# Patient Record
Sex: FEMALE | Race: WHITE | NOT HISPANIC OR LATINO | URBAN - NONMETROPOLITAN AREA
[De-identification: names, ages, dates, MRNs, and addresses within clinical notes are randomized per-mention and may not be internally consistent; named-entity substitution may affect disease eponyms.]

---

## 2023-04-18 DIAGNOSIS — E03.9 HYPOTHYROIDISM (ACQUIRED): Primary | ICD-10-CM

## 2023-04-18 RX ORDER — LEVOTHYROXINE SODIUM 112 UG/1
1 TABLET ORAL DAILY
COMMUNITY
Start: 2023-03-14 | End: 2023-04-18 | Stop reason: SDUPTHER

## 2023-04-19 RX ORDER — LEVOTHYROXINE SODIUM 112 UG/1
112 TABLET ORAL DAILY
Qty: 30 TABLET | Refills: 0 | Status: SHIPPED | OUTPATIENT
Start: 2023-04-19 | End: 2023-05-15

## 2023-04-26 ENCOUNTER — OFFICE VISIT (OUTPATIENT)
Dept: PRIMARY CARE | Facility: CLINIC | Age: 53
End: 2023-04-26
Payer: COMMERCIAL

## 2023-04-26 VITALS
HEART RATE: 100 BPM | HEIGHT: 67 IN | OXYGEN SATURATION: 99 % | SYSTOLIC BLOOD PRESSURE: 132 MMHG | WEIGHT: 160 LBS | BODY MASS INDEX: 25.11 KG/M2 | DIASTOLIC BLOOD PRESSURE: 80 MMHG

## 2023-04-26 DIAGNOSIS — M75.81 ROTATOR CUFF TENDINITIS, RIGHT: ICD-10-CM

## 2023-04-26 DIAGNOSIS — E78.1 HYPERTRIGLYCERIDEMIA: ICD-10-CM

## 2023-04-26 DIAGNOSIS — Z12.31 SCREENING MAMMOGRAM, ENCOUNTER FOR: ICD-10-CM

## 2023-04-26 DIAGNOSIS — Z12.11 ENCOUNTER FOR SCREENING COLONOSCOPY: ICD-10-CM

## 2023-04-26 DIAGNOSIS — M75.51 BURSITIS OF RIGHT SHOULDER: ICD-10-CM

## 2023-04-26 DIAGNOSIS — E55.9 VITAMIN D DEFICIENCY: ICD-10-CM

## 2023-04-26 DIAGNOSIS — G25.0 BENIGN ESSENTIAL TREMOR: ICD-10-CM

## 2023-04-26 DIAGNOSIS — R73.01 IMPAIRED FASTING GLUCOSE: ICD-10-CM

## 2023-04-26 DIAGNOSIS — E03.9 ACQUIRED HYPOTHYROIDISM: Primary | ICD-10-CM

## 2023-04-26 PROBLEM — R53.83 MALAISE AND FATIGUE: Status: RESOLVED | Noted: 2023-04-26 | Resolved: 2023-04-26

## 2023-04-26 PROBLEM — R07.89 OTHER CHEST PAIN: Status: RESOLVED | Noted: 2023-04-26 | Resolved: 2023-04-26

## 2023-04-26 PROBLEM — M54.12 CERVICAL RADICULOPATHY: Status: ACTIVE | Noted: 2023-04-26

## 2023-04-26 PROBLEM — R03.0 ELEVATED BLOOD PRESSURE READING: Status: ACTIVE | Noted: 2023-04-26

## 2023-04-26 PROBLEM — M54.42 ACUTE LEFT-SIDED LOW BACK PAIN WITH LEFT-SIDED SCIATICA: Status: RESOLVED | Noted: 2023-04-26 | Resolved: 2023-04-26

## 2023-04-26 PROBLEM — L29.9 ITCHING OF EAR: Status: RESOLVED | Noted: 2023-04-26 | Resolved: 2023-04-26

## 2023-04-26 PROBLEM — S05.8X9A SUPERFICIAL INJURY OF CORNEA: Status: RESOLVED | Noted: 2023-04-26 | Resolved: 2023-04-26

## 2023-04-26 PROBLEM — R25.1 TREMOR: Status: ACTIVE | Noted: 2023-04-26

## 2023-04-26 PROBLEM — R53.81 MALAISE AND FATIGUE: Status: RESOLVED | Noted: 2023-04-26 | Resolved: 2023-04-26

## 2023-04-26 PROBLEM — J30.9 ALLERGIC RHINITIS: Status: ACTIVE | Noted: 2023-04-26

## 2023-04-26 LAB
CHOLESTEROL (MG/DL) IN SER/PLAS: 230 MG/DL (ref 0–199)
CHOLESTEROL IN HDL (MG/DL) IN SER/PLAS: 43.6 MG/DL
CHOLESTEROL/HDL RATIO: 5.3
GLUCOSE (MG/DL) IN SER/PLAS: 102 MG/DL (ref 74–99)
LDL: 158 MG/DL (ref 0–99)
THYROTROPIN (MIU/L) IN SER/PLAS BY DETECTION LIMIT <= 0.05 MIU/L: 1.18 MIU/L (ref 0.44–3.98)
TRIGLYCERIDE (MG/DL) IN SER/PLAS: 144 MG/DL (ref 0–149)
VLDL: 29 MG/DL (ref 0–40)

## 2023-04-26 PROCEDURE — 84443 ASSAY THYROID STIM HORMONE: CPT

## 2023-04-26 PROCEDURE — 80061 LIPID PANEL: CPT

## 2023-04-26 PROCEDURE — 1036F TOBACCO NON-USER: CPT | Performed by: FAMILY MEDICINE

## 2023-04-26 PROCEDURE — 82947 ASSAY GLUCOSE BLOOD QUANT: CPT

## 2023-04-26 PROCEDURE — 36415 COLL VENOUS BLD VENIPUNCTURE: CPT | Performed by: FAMILY MEDICINE

## 2023-04-26 PROCEDURE — 99214 OFFICE O/P EST MOD 30 MIN: CPT | Performed by: FAMILY MEDICINE

## 2023-04-26 PROCEDURE — 82306 VITAMIN D 25 HYDROXY: CPT

## 2023-04-26 RX ORDER — METHYLPREDNISOLONE 4 MG/1
TABLET ORAL
Qty: 21 TABLET | Refills: 0 | Status: SHIPPED | OUTPATIENT
Start: 2023-04-26 | End: 2023-05-03

## 2023-04-26 RX ORDER — CHOLECALCIFEROL (VITAMIN D3) 25 MCG
25 TABLET ORAL DAILY
COMMUNITY

## 2023-04-26 RX ORDER — CALCIUM CARBONATE 300MG(750)
400 TABLET,CHEWABLE ORAL DAILY
COMMUNITY

## 2023-04-26 NOTE — PROGRESS NOTES
Subjective   Patient ID: Arlyn Ervin is a 53 y.o. female who presents for Hypothyroidism (Thyroid check up ).  HPI  No SE meds  No CP, SOB, palpitations, numbness, weakness, dizziness, HA, vision changes    Saw ENT for irritation in ear- given drop that helped some   Has patches on knees- do not bleed when rubs off the scale    Feels like muscles tight in trap area on the right that will go down into arm (mostly only upper arm but will go to forearm)- pain on top of shoulder- been going on for years  Used heat on it    Energy level is okay  No constipation     Ophtho- 8/22  Dentist- 4-5 years  Colonoscopy- refused  DANIELLE- refused  Cologuard- refused  FOBT- refused  Mammo- ordered  DEXA-  PAP- 2019  Lung CT-  AAA-  EKG-  Pneumovax-  Prevnar-  Flu- refused  Zostavax-  Td-  Hep C- 1/20  DPOA-HC-  DNR-  Living Will     Current Outpatient Medications:     cholecalciferol (Vitamin D-3) 25 MCG (1000 UT) tablet, Take 1 tablet (25 mcg) by mouth once daily., Disp: , Rfl:     magnesium oxide (Mag-Ox) 400 mg tablet, Take 1 tablet (400 mg) by mouth once daily., Disp: , Rfl:     methylPREDNISolone (Medrol Dospak) 4 mg tablets, Take as directed on package., Disp: 21 tablet, Rfl: 0    Synthroid 112 mcg tablet, Take 1 tablet (112 mcg) by mouth once daily., Disp: 30 tablet, Rfl: 0    vitamin B complex (B-COMPLEX ORAL), Take by mouth., Disp: , Rfl:     ZINC ACETATE ORAL, Take 20 mg by mouth., Disp: , Rfl:    Past Surgical History:   Procedure Laterality Date    LUMBAR DISCECTOMY  03/07/2022    OTHER SURGICAL HISTORY  01/16/2019    Lumbar discectomy      Past Medical History:   Diagnosis Date    Acute gastritis without bleeding 01/16/2019    Acute gastritis without hemorrhage    Acute left-sided low back pain with left-sided sciatica 04/26/2023    Acute upper respiratory infection, unspecified 03/28/2016    Viral upper respiratory illness    Encounter for other preprocedural examination 06/24/2014    Pre-operative general physical  "examination    Encounter for screening for diabetes mellitus 04/26/2018    Diabetes mellitus screening    Encounter for screening for lipoid disorders 04/26/2018    Encounter for screening for lipoid disorders    Essential (primary) hypertension 01/22/2016    Benign essential hypertension    Other intervertebral disc displacement, lumbar region 08/29/2014    Lumbar herniated disc    Personal history of other diseases of the nervous system and sense organs 02/24/2018    History of conjunctivitis    Sprain of jaw, unspecified side, initial encounter 04/15/2015    TMJ (sprain of temporomandibular joint)     Social History     Tobacco Use    Smoking status: Never    Smokeless tobacco: Never   Substance Use Topics    Alcohol use: Not Currently    Drug use: Never      No family history on file.   Review of Systems    Objective   /80   Pulse 100   Ht 1.702 m (5' 7\")   Wt 72.6 kg (160 lb)   SpO2 99%   BMI 25.06 kg/m²    Physical Exam  Vitals and nursing note reviewed.   Constitutional:       General: She is not in acute distress.     Appearance: Normal appearance.   HENT:      Head: Normocephalic and atraumatic.      Right Ear: Tympanic membrane, ear canal and external ear normal.      Left Ear: Tympanic membrane, ear canal and external ear normal.      Nose: Nose normal.      Mouth/Throat:      Mouth: Mucous membranes are moist.      Pharynx: Oropharynx is clear.   Eyes:      Extraocular Movements: Extraocular movements intact.      Pupils: Pupils are equal, round, and reactive to light.   Cardiovascular:      Rate and Rhythm: Normal rate and regular rhythm.      Pulses: Normal pulses.      Heart sounds: Normal heart sounds. No murmur heard.  Pulmonary:      Effort: Pulmonary effort is normal.      Breath sounds: Normal breath sounds.   Abdominal:      General: Abdomen is flat. Bowel sounds are normal.      Palpations: Abdomen is soft. There is no mass.   Musculoskeletal:      Cervical back: Normal range of " motion and neck supple.      Comments: TTP in right trap area and anterior shoulder  + impingement in right shoulder   Lymphadenopathy:      Cervical: No cervical adenopathy.   Skin:     Capillary Refill: Capillary refill takes less than 2 seconds.      Comments: Dry patches on b/l knees   Neurological:      General: No focal deficit present.      Mental Status: She is alert and oriented to person, place, and time.      Cranial Nerves: No cranial nerve deficit.      Motor: No weakness.      Deep Tendon Reflexes: Reflexes normal.      Comments: Slight head tremor   Psychiatric:         Mood and Affect: Mood normal.         Behavior: Behavior normal.         Assessment/Plan   Problem List Items Addressed This Visit       Hypertriglyceridemia    Relevant Orders    Lipid Panel (Completed)    Hypothyroidism - Primary    Relevant Orders    TSH with reflex to Free T4 if abnormal (Completed)    Impaired fasting glucose    Relevant Orders    Glucose (Completed)    Benign essential tremor    Vitamin D deficiency    Relevant Orders    Vitamin D, Total    Rotator cuff tendinitis, right    Relevant Medications    methylPREDNISolone (Medrol Dospak) 4 mg tablets    Other Relevant Orders    Referral to Physical Therapy    Bursitis of right shoulder    Relevant Medications    methylPREDNISolone (Medrol Dospak) 4 mg tablets    Other Relevant Orders    Referral to Physical Therapy     Other Visit Diagnoses       Encounter for screening colonoscopy        Relevant Orders    Colonoscopy    Screening mammogram, encounter for        Relevant Orders    BI mammo bilateral screening tomosynthesis        HTN- DASH diet, controlled without meds    Bursitis/rotator cuff tendinitis- heat, medrol, PT    Hyperlipidemia/IFG- CV exercise/limit fatty foods    Tremor- refused meds at this time, limit caffeine    Hypothyroidism- check TSH, synthroid    Patient understands and agrees with treatment plan    Luis A Howard, DO

## 2023-04-27 LAB — CALCIDIOL (25 OH VITAMIN D3) (NG/ML) IN SER/PLAS: 24 NG/ML

## 2023-04-27 NOTE — RESULT ENCOUNTER NOTE
Cholesterol levels slightly elevated- limit fatty foods and increase CV exercise. Blood sugar slightly elevated for fasting- limit sugary foods. Thyroid function is good. Vitamin D level is slightly low- double vitamin D supplement

## 2023-04-28 ENCOUNTER — TELEPHONE (OUTPATIENT)
Dept: PRIMARY CARE | Facility: CLINIC | Age: 53
End: 2023-04-28
Payer: COMMERCIAL

## 2023-05-15 DIAGNOSIS — E03.9 HYPOTHYROIDISM (ACQUIRED): ICD-10-CM

## 2023-05-15 RX ORDER — LEVOTHYROXINE SODIUM 112 UG/1
112 TABLET ORAL DAILY
Qty: 90 TABLET | Refills: 1 | Status: SHIPPED | OUTPATIENT
Start: 2023-05-15 | End: 2023-06-09 | Stop reason: SDUPTHER

## 2023-05-21 NOTE — TELEPHONE ENCOUNTER
Late entry -   Pt paged 8:27 pm     Took a dose of steroids she was given  -facial flushing     Discussed this common side effect -   And its benign -     Discussed signs and sx that are of concern to stop med if occur -   Such as SOB or hives

## 2023-06-09 DIAGNOSIS — E03.9 HYPOTHYROIDISM (ACQUIRED): ICD-10-CM

## 2023-06-09 RX ORDER — LEVOTHYROXINE SODIUM 112 UG/1
112 TABLET ORAL DAILY
Qty: 90 TABLET | Refills: 1 | Status: SHIPPED | OUTPATIENT
Start: 2023-06-09 | End: 2023-11-13

## 2023-11-12 DIAGNOSIS — E03.9 HYPOTHYROIDISM (ACQUIRED): ICD-10-CM

## 2023-11-13 RX ORDER — LEVOTHYROXINE SODIUM 112 UG/1
112 TABLET ORAL DAILY
Qty: 90 TABLET | Refills: 1 | Status: SHIPPED | OUTPATIENT
Start: 2023-11-13 | End: 2024-04-05

## 2024-03-12 ENCOUNTER — OFFICE VISIT (OUTPATIENT)
Dept: PRIMARY CARE | Facility: CLINIC | Age: 54
End: 2024-03-12
Payer: COMMERCIAL

## 2024-03-12 VITALS
OXYGEN SATURATION: 97 % | SYSTOLIC BLOOD PRESSURE: 126 MMHG | HEART RATE: 82 BPM | DIASTOLIC BLOOD PRESSURE: 78 MMHG | WEIGHT: 163 LBS | HEIGHT: 67 IN | BODY MASS INDEX: 25.58 KG/M2

## 2024-03-12 DIAGNOSIS — J01.80 ACUTE NON-RECURRENT SINUSITIS OF OTHER SINUS: Primary | ICD-10-CM

## 2024-03-12 PROCEDURE — 1036F TOBACCO NON-USER: CPT | Performed by: FAMILY MEDICINE

## 2024-03-12 PROCEDURE — 99213 OFFICE O/P EST LOW 20 MIN: CPT | Performed by: FAMILY MEDICINE

## 2024-03-12 RX ORDER — CLOBETASOL PROPIONATE 0.5 MG/G
CREAM TOPICAL 2 TIMES DAILY
COMMUNITY
Start: 2023-12-02

## 2024-03-12 RX ORDER — AMOXICILLIN 875 MG/1
875 TABLET, FILM COATED ORAL 2 TIMES DAILY
Qty: 20 TABLET | Refills: 0 | Status: SHIPPED | OUTPATIENT
Start: 2024-03-12 | End: 2024-03-22

## 2024-03-12 NOTE — PROGRESS NOTES
Subjective   Patient ID: Arlyn Ervin is a 53 y.o. female who presents for URI (HEAD CONGESTION CHEST CONGESTION PRODUCTIVE COUGH, NO FEVER NO CHILLS, FATIGUE ).  HPI  Has been sick for 2-3 weeks  + sinus drainage  + PND, runny nose  + NP and Productive cough   No fever, chills  HA off and on  No ear pain, ST  Chest feels sore  No SOB, n/v, diarrhea, abdominal pain   Slight fatigue  Body aches off and on  Taking Vitamin C, vaporizer    Current Outpatient Medications:     clobetasol (Temovate) 0.05 % cream, Apply topically 2 times a day., Disp: , Rfl:     amoxicillin (Amoxil) 875 mg tablet, Take 1 tablet (875 mg) by mouth 2 times a day for 10 days., Disp: 20 tablet, Rfl: 0    cholecalciferol (Vitamin D-3) 25 MCG (1000 UT) tablet, Take 1 tablet (25 mcg) by mouth once daily., Disp: , Rfl:     magnesium oxide (Mag-Ox) 400 mg tablet, Take 1 tablet (400 mg) by mouth once daily., Disp: , Rfl:     Synthroid 112 mcg tablet, take 1 tablet by mouth once daily, Disp: 90 tablet, Rfl: 1    vitamin B complex (B-COMPLEX ORAL), Take by mouth., Disp: , Rfl:     ZINC ACETATE ORAL, Take 20 mg by mouth., Disp: , Rfl:    Past Surgical History:   Procedure Laterality Date    LUMBAR DISCECTOMY  03/07/2022    OTHER SURGICAL HISTORY  01/16/2019    Lumbar discectomy      Past Medical History:   Diagnosis Date    Acute gastritis without bleeding 01/16/2019    Acute gastritis without hemorrhage    Acute left-sided low back pain with left-sided sciatica 04/26/2023    Acute upper respiratory infection, unspecified 03/28/2016    Viral upper respiratory illness    Encounter for other preprocedural examination 06/24/2014    Pre-operative general physical examination    Encounter for screening for diabetes mellitus 04/26/2018    Diabetes mellitus screening    Encounter for screening for lipoid disorders 04/26/2018    Encounter for screening for lipoid disorders    Essential (primary) hypertension 01/22/2016    Benign essential hypertension    Other  "intervertebral disc displacement, lumbar region 08/29/2014    Lumbar herniated disc    Personal history of other diseases of the nervous system and sense organs 02/24/2018    History of conjunctivitis    Sprain of jaw, unspecified side, initial encounter 04/15/2015    TMJ (sprain of temporomandibular joint)     Social History     Tobacco Use    Smoking status: Never    Smokeless tobacco: Never   Substance Use Topics    Alcohol use: Not Currently    Drug use: Never      No family history on file.   Review of Systems    Objective   /78   Pulse 82   Ht 1.702 m (5' 7\")   Wt 73.9 kg (163 lb)   SpO2 97%   BMI 25.53 kg/m²    Physical Exam  Vitals and nursing note reviewed.   Constitutional:       General: She is not in acute distress.     Appearance: Normal appearance. She is not ill-appearing.   HENT:      Head: Normocephalic and atraumatic.      Right Ear: Tympanic membrane, ear canal and external ear normal.      Left Ear: Tympanic membrane, ear canal and external ear normal.      Nose: Congestion present.      Mouth/Throat:      Mouth: Mucous membranes are moist.      Pharynx: Posterior oropharyngeal erythema present. No oropharyngeal exudate.   Eyes:      General:         Right eye: No discharge.         Left eye: No discharge.      Extraocular Movements: Extraocular movements intact.      Conjunctiva/sclera: Conjunctivae normal.      Pupils: Pupils are equal, round, and reactive to light.   Cardiovascular:      Rate and Rhythm: Normal rate and regular rhythm.      Pulses: Normal pulses.      Heart sounds: Normal heart sounds.   Pulmonary:      Effort: Pulmonary effort is normal.      Breath sounds: Normal breath sounds. No wheezing, rhonchi or rales.   Musculoskeletal:      Cervical back: Normal range of motion and neck supple.   Lymphadenopathy:      Cervical: Cervical adenopathy present.      Right cervical: Superficial cervical adenopathy present. No deep or posterior cervical adenopathy.     Left " cervical: Superficial cervical adenopathy present. No deep or posterior cervical adenopathy.   Skin:     Capillary Refill: Capillary refill takes less than 2 seconds.   Neurological:      General: No focal deficit present.      Mental Status: She is alert and oriented to person, place, and time.   Psychiatric:         Mood and Affect: Mood normal.         Behavior: Behavior normal.         Assessment/Plan   Problem List Items Addressed This Visit    None  Visit Diagnoses       Acute non-recurrent sinusitis of other sinus    -  Primary    Relevant Medications    amoxicillin (Amoxil) 875 mg tablet        Fluids, OTC meds    Told parent/patient that if no improvement in 2-3 days then please call. If worsens or new symptoms occur then please call when this occurs. If worsening then go to ER immediately      Patient understands and agrees with treatment plan    Luis A Howard, DO

## 2024-04-05 DIAGNOSIS — E03.9 HYPOTHYROIDISM (ACQUIRED): ICD-10-CM

## 2024-04-05 RX ORDER — LEVOTHYROXINE SODIUM 112 UG/1
112 TABLET ORAL DAILY
Qty: 90 TABLET | Refills: 1 | Status: SHIPPED | OUTPATIENT
Start: 2024-04-05

## 2024-06-26 DIAGNOSIS — E03.9 HYPOTHYROIDISM (ACQUIRED): ICD-10-CM

## 2024-06-26 RX ORDER — LEVOTHYROXINE SODIUM 112 UG/1
112 TABLET ORAL DAILY
Qty: 30 TABLET | Refills: 0 | Status: SHIPPED | OUTPATIENT
Start: 2024-06-26

## 2024-07-19 ENCOUNTER — APPOINTMENT (OUTPATIENT)
Dept: PRIMARY CARE | Facility: CLINIC | Age: 54
End: 2024-07-19
Payer: COMMERCIAL

## 2024-07-19 VITALS
BODY MASS INDEX: 26.53 KG/M2 | HEIGHT: 67 IN | HEART RATE: 88 BPM | DIASTOLIC BLOOD PRESSURE: 80 MMHG | WEIGHT: 169 LBS | OXYGEN SATURATION: 98 % | SYSTOLIC BLOOD PRESSURE: 140 MMHG

## 2024-07-19 DIAGNOSIS — Z12.31 VISIT FOR SCREENING MAMMOGRAM: ICD-10-CM

## 2024-07-19 DIAGNOSIS — E78.1 HYPERTRIGLYCERIDEMIA: ICD-10-CM

## 2024-07-19 DIAGNOSIS — Z12.11 SCREENING FOR COLORECTAL CANCER: ICD-10-CM

## 2024-07-19 DIAGNOSIS — E55.9 VITAMIN D DEFICIENCY: ICD-10-CM

## 2024-07-19 DIAGNOSIS — E03.9 ACQUIRED HYPOTHYROIDISM: ICD-10-CM

## 2024-07-19 DIAGNOSIS — R53.83 FATIGUE, UNSPECIFIED TYPE: ICD-10-CM

## 2024-07-19 DIAGNOSIS — Z00.00 WELL ADULT EXAM: Primary | ICD-10-CM

## 2024-07-19 DIAGNOSIS — Z12.12 SCREENING FOR COLORECTAL CANCER: ICD-10-CM

## 2024-07-19 DIAGNOSIS — G47.19 DAYTIME HYPERSOMNOLENCE: ICD-10-CM

## 2024-07-19 LAB
ALBUMIN SERPL BCP-MCNC: 4.5 G/DL (ref 3.4–5)
ALP SERPL-CCNC: 77 U/L (ref 33–110)
ALT SERPL W P-5'-P-CCNC: 16 U/L (ref 7–45)
ANION GAP SERPL CALC-SCNC: 12 MMOL/L (ref 10–20)
AST SERPL W P-5'-P-CCNC: 18 U/L (ref 9–39)
BILIRUB SERPL-MCNC: 0.7 MG/DL (ref 0–1.2)
BUN SERPL-MCNC: 13 MG/DL (ref 6–23)
CALCIUM SERPL-MCNC: 9.3 MG/DL (ref 8.6–10.3)
CHLORIDE SERPL-SCNC: 105 MMOL/L (ref 98–107)
CHOLEST SERPL-MCNC: 274 MG/DL (ref 0–199)
CHOLESTEROL/HDL RATIO: 6.2
CO2 SERPL-SCNC: 26 MMOL/L (ref 21–32)
CREAT SERPL-MCNC: 0.74 MG/DL (ref 0.5–1.05)
EGFRCR SERPLBLD CKD-EPI 2021: >90 ML/MIN/1.73M*2
ERYTHROCYTE [DISTWIDTH] IN BLOOD BY AUTOMATED COUNT: 13.6 % (ref 11.5–14.5)
GLUCOSE SERPL-MCNC: 105 MG/DL (ref 74–99)
HCT VFR BLD AUTO: 43.8 % (ref 36–46)
HDLC SERPL-MCNC: 44.4 MG/DL
HGB BLD-MCNC: 14.2 G/DL (ref 12–16)
LDLC SERPL CALC-MCNC: 179 MG/DL
MCH RBC QN AUTO: 31.4 PG (ref 26–34)
MCHC RBC AUTO-ENTMCNC: 32.4 G/DL (ref 32–36)
MCV RBC AUTO: 97 FL (ref 80–100)
NON HDL CHOLESTEROL: 230 MG/DL (ref 0–149)
NRBC BLD-RTO: 0 /100 WBCS (ref 0–0)
PLATELET # BLD AUTO: 284 X10*3/UL (ref 150–450)
POTASSIUM SERPL-SCNC: 4.5 MMOL/L (ref 3.5–5.3)
PROT SERPL-MCNC: 7.4 G/DL (ref 6.4–8.2)
RBC # BLD AUTO: 4.52 X10*6/UL (ref 4–5.2)
SODIUM SERPL-SCNC: 138 MMOL/L (ref 136–145)
T4 FREE SERPL-MCNC: 0.82 NG/DL (ref 0.61–1.12)
TRIGL SERPL-MCNC: 252 MG/DL (ref 0–149)
TSH SERPL-ACNC: 5.21 MIU/L (ref 0.44–3.98)
VLDL: 50 MG/DL (ref 0–40)
WBC # BLD AUTO: 4.5 X10*3/UL (ref 4.4–11.3)

## 2024-07-19 PROCEDURE — 84439 ASSAY OF FREE THYROXINE: CPT

## 2024-07-19 PROCEDURE — 90471 IMMUNIZATION ADMIN: CPT | Performed by: FAMILY MEDICINE

## 2024-07-19 PROCEDURE — 99396 PREV VISIT EST AGE 40-64: CPT | Performed by: FAMILY MEDICINE

## 2024-07-19 PROCEDURE — 80061 LIPID PANEL: CPT

## 2024-07-19 PROCEDURE — 1036F TOBACCO NON-USER: CPT | Performed by: FAMILY MEDICINE

## 2024-07-19 PROCEDURE — 85027 COMPLETE CBC AUTOMATED: CPT

## 2024-07-19 PROCEDURE — 84443 ASSAY THYROID STIM HORMONE: CPT

## 2024-07-19 PROCEDURE — 90715 TDAP VACCINE 7 YRS/> IM: CPT | Performed by: FAMILY MEDICINE

## 2024-07-19 PROCEDURE — 36415 COLL VENOUS BLD VENIPUNCTURE: CPT

## 2024-07-19 PROCEDURE — 3008F BODY MASS INDEX DOCD: CPT | Performed by: FAMILY MEDICINE

## 2024-07-19 PROCEDURE — 80053 COMPREHEN METABOLIC PANEL: CPT

## 2024-07-19 PROCEDURE — 82306 VITAMIN D 25 HYDROXY: CPT

## 2024-07-19 ASSESSMENT — ENCOUNTER SYMPTOMS
POLYPHAGIA: 0
WHEEZING: 0
TREMORS: 0
SHORTNESS OF BREATH: 0
COUGH: 0
NAUSEA: 0
COLOR CHANGE: 0
ARTHRALGIAS: 0
DYSURIA: 0
WEAKNESS: 0
HEADACHES: 0
DYSPHORIC MOOD: 0
ADENOPATHY: 0
NERVOUS/ANXIOUS: 1
CHEST TIGHTNESS: 0
EYE REDNESS: 0
BLOOD IN STOOL: 0
BRUISES/BLEEDS EASILY: 0
CHILLS: 0
DIARRHEA: 0
FATIGUE: 0
VOMITING: 0
FEVER: 0
EYE PAIN: 0
TROUBLE SWALLOWING: 0
PALPITATIONS: 0
FREQUENCY: 0
BACK PAIN: 0
POLYDIPSIA: 0
ABDOMINAL PAIN: 0
CONSTIPATION: 0
HEMATURIA: 0
SORE THROAT: 0
DIZZINESS: 0
NUMBNESS: 0

## 2024-07-19 NOTE — PROGRESS NOTES
Subjective   Patient ID: Arlyn Ervin is a 54 y.o. female who presents for Annual Exam (Check up ).  HPI  No SE meds  No CP, SOB, palpitations, numbness, weakness, dizziness, HA, vision changes     Snores a lot-  says she stops breathing  Feeling extra tired recently  Can sometimes fall asleep in a quiet room  Does not feel refreshed when wake depending on how much sleeps     Ophtho- 8/22  Dentist- 4-5 years  Colonoscopy- refused  DANIELLE- refused  Cologuard- ordered  FOBT- refused  Mammo- ordered  DEXA-  PAP- 2019  Lung CT-  AAA-  EKG-  Pneumovax-  Prevnar-  Flu- refused  Zostavax-  Td- 11/14  Hep C- 1/20  Advance Directives      Current Outpatient Medications:     cholecalciferol (Vitamin D-3) 25 MCG (1000 UT) tablet, Take 1 tablet (25 mcg) by mouth once daily., Disp: , Rfl:     clobetasol (Temovate) 0.05 % cream, Apply topically 2 times a day., Disp: , Rfl:     levothyroxine (Synthroid) 112 mcg tablet, Take 1 tablet (112 mcg) by mouth once daily., Disp: 30 tablet, Rfl: 0    magnesium oxide (Mag-Ox) 400 mg tablet, Take 1 tablet (400 mg) by mouth once daily., Disp: , Rfl:     vitamin B complex (B-COMPLEX ORAL), Take by mouth., Disp: , Rfl:     ZINC ACETATE ORAL, Take 20 mg by mouth., Disp: , Rfl:    Past Surgical History:   Procedure Laterality Date    LUMBAR DISCECTOMY  03/07/2022    OTHER SURGICAL HISTORY  01/16/2019    Lumbar discectomy      Past Medical History:   Diagnosis Date    Acute gastritis without bleeding 01/16/2019    Acute gastritis without hemorrhage    Acute left-sided low back pain with left-sided sciatica 04/26/2023    Acute upper respiratory infection, unspecified 03/28/2016    Viral upper respiratory illness    Encounter for other preprocedural examination 06/24/2014    Pre-operative general physical examination    Encounter for screening for diabetes mellitus 04/26/2018    Diabetes mellitus screening    Encounter for screening for lipoid disorders 04/26/2018    Encounter for screening for  "lipoid disorders    Essential (primary) hypertension 01/22/2016    Benign essential hypertension    Other intervertebral disc displacement, lumbar region 08/29/2014    Lumbar herniated disc    Personal history of other diseases of the nervous system and sense organs 02/24/2018    History of conjunctivitis    Sprain of jaw, unspecified side, initial encounter 04/15/2015    TMJ (sprain of temporomandibular joint)     Social History     Tobacco Use    Smoking status: Never    Smokeless tobacco: Never   Substance Use Topics    Alcohol use: Not Currently    Drug use: Never      No family history on file.   Review of Systems   Constitutional:  Negative for chills, fatigue and fever.   HENT:  Negative for congestion, ear discharge, ear pain, hearing loss, nosebleeds, sore throat, tinnitus and trouble swallowing.    Eyes:  Negative for pain, redness and visual disturbance.   Respiratory:  Negative for cough, chest tightness, shortness of breath and wheezing.    Cardiovascular:  Negative for chest pain, palpitations and leg swelling.   Gastrointestinal:  Negative for abdominal pain, blood in stool, constipation, diarrhea, nausea and vomiting.   Endocrine: Negative for cold intolerance, heat intolerance, polydipsia, polyphagia and polyuria.   Genitourinary:  Negative for dysuria, frequency, hematuria and urgency.   Musculoskeletal:  Negative for arthralgias, back pain and gait problem.   Skin:  Negative for color change and rash.   Neurological:  Negative for dizziness, tremors, syncope, weakness, numbness and headaches.   Hematological:  Negative for adenopathy. Does not bruise/bleed easily.   Psychiatric/Behavioral:  Negative for dysphoric mood. The patient is nervous/anxious.        Objective   /80   Pulse 88   Ht 1.702 m (5' 7\")   Wt 76.7 kg (169 lb)   SpO2 98%   BMI 26.47 kg/m²    Physical Exam  Vitals and nursing note reviewed.   Constitutional:       General: She is not in acute distress.     Appearance: " Normal appearance.   HENT:      Head: Normocephalic and atraumatic.      Right Ear: Tympanic membrane, ear canal and external ear normal.      Left Ear: Tympanic membrane, ear canal and external ear normal.      Nose: Nose normal.      Mouth/Throat:      Mouth: Mucous membranes are moist.      Pharynx: Oropharynx is clear.   Eyes:      Extraocular Movements: Extraocular movements intact.      Pupils: Pupils are equal, round, and reactive to light.   Cardiovascular:      Rate and Rhythm: Normal rate and regular rhythm.      Pulses: Normal pulses.      Heart sounds: Normal heart sounds. No murmur heard.  Pulmonary:      Effort: Pulmonary effort is normal.      Breath sounds: Normal breath sounds.   Abdominal:      General: Abdomen is flat. Bowel sounds are normal.      Palpations: Abdomen is soft. There is no mass.   Musculoskeletal:      Cervical back: Normal range of motion and neck supple.      Comments: TTP in right trap area and anterior shoulder  + impingement in right shoulder   Lymphadenopathy:      Cervical: No cervical adenopathy.   Skin:     Capillary Refill: Capillary refill takes less than 2 seconds.      Comments: Dry patches on b/l knees   Neurological:      General: No focal deficit present.      Mental Status: She is alert and oriented to person, place, and time.      Cranial Nerves: No cranial nerve deficit.      Motor: No weakness.      Deep Tendon Reflexes: Reflexes normal.      Comments: Slight head tremor   Psychiatric:         Mood and Affect: Mood normal.         Behavior: Behavior normal.         Assessment/Plan   Problem List Items Addressed This Visit       Vitamin D deficiency    Relevant Orders    Comprehensive Metabolic Panel    Vitamin D 25-Hydroxy,Total (for eval of Vitamin D levels)    Hypothyroidism    Relevant Orders    TSH with reflex to Free T4 if abnormal    Hypertriglyceridemia    Relevant Orders    Lipid Panel     Other Visit Diagnoses       Well adult exam    -  Primary     Visit for screening mammogram        Relevant Orders    BI mammo bilateral screening tomosynthesis    Fatigue, unspecified type        Relevant Orders    CBC (Completed)    Comprehensive Metabolic Panel    Daytime hypersomnolence        Relevant Orders    In-Center Sleep Study (Non-Sleep Provider Only)    Screening for colorectal cancer        Relevant Orders    Cologuard® colon cancer screening            Patient understands and agrees with treatment plan    Luis A Howard, DO

## 2024-07-20 LAB — 25(OH)D3 SERPL-MCNC: 26 NG/ML (ref 30–100)

## 2024-07-22 DIAGNOSIS — E03.9 ACQUIRED HYPOTHYROIDISM: Primary | ICD-10-CM

## 2024-07-22 DIAGNOSIS — E55.9 VITAMIN D DEFICIENCY: ICD-10-CM

## 2024-07-22 RX ORDER — LEVOTHYROXINE SODIUM 125 UG/1
125 TABLET ORAL DAILY
Qty: 30 TABLET | Refills: 11 | Status: SHIPPED | OUTPATIENT
Start: 2024-07-22 | End: 2025-07-22

## 2024-07-22 RX ORDER — CHOLECALCIFEROL (VITAMIN D3) 125 MCG
125 CAPSULE ORAL DAILY
Qty: 30 CAPSULE | Refills: 11 | Status: SHIPPED | OUTPATIENT
Start: 2024-07-22 | End: 2025-07-22

## 2024-08-12 ENCOUNTER — TELEPHONE (OUTPATIENT)
Dept: PRIMARY CARE | Facility: CLINIC | Age: 54
End: 2024-08-12
Payer: COMMERCIAL

## 2024-08-12 DIAGNOSIS — R25.1 TREMOR: Primary | ICD-10-CM

## 2024-08-12 RX ORDER — DIAZEPAM 10 MG/1
10 TABLET ORAL ONCE
Qty: 1 TABLET | Refills: 0 | Status: SHIPPED | OUTPATIENT
Start: 2024-08-12 | End: 2024-08-12

## 2024-09-09 ENCOUNTER — HOSPITAL ENCOUNTER (OUTPATIENT)
Dept: RADIOLOGY | Facility: HOSPITAL | Age: 54
Discharge: HOME | End: 2024-09-09
Payer: COMMERCIAL

## 2024-09-09 VITALS — BODY MASS INDEX: 26.68 KG/M2 | HEIGHT: 67 IN | WEIGHT: 170 LBS

## 2024-09-09 DIAGNOSIS — Z12.31 VISIT FOR SCREENING MAMMOGRAM: ICD-10-CM

## 2024-09-09 PROCEDURE — 77067 SCR MAMMO BI INCL CAD: CPT

## 2024-09-09 PROCEDURE — 77063 BREAST TOMOSYNTHESIS BI: CPT | Performed by: RADIOLOGY

## 2024-09-09 PROCEDURE — 77067 SCR MAMMO BI INCL CAD: CPT | Performed by: RADIOLOGY

## 2024-09-19 ENCOUNTER — APPOINTMENT (OUTPATIENT)
Dept: PRIMARY CARE | Facility: CLINIC | Age: 54
End: 2024-09-19
Payer: COMMERCIAL

## 2024-09-19 DIAGNOSIS — E03.9 ACQUIRED HYPOTHYROIDISM: ICD-10-CM

## 2024-09-19 LAB — TSH SERPL-ACNC: 1.32 MIU/L (ref 0.44–3.98)

## 2024-09-19 PROCEDURE — 84443 ASSAY THYROID STIM HORMONE: CPT

## 2024-10-18 ENCOUNTER — OFFICE VISIT (OUTPATIENT)
Dept: PRIMARY CARE | Facility: CLINIC | Age: 54
End: 2024-10-18
Payer: COMMERCIAL

## 2024-10-18 ENCOUNTER — HOSPITAL ENCOUNTER (OUTPATIENT)
Dept: RADIOLOGY | Facility: CLINIC | Age: 54
Discharge: HOME | End: 2024-10-18
Payer: COMMERCIAL

## 2024-10-18 VITALS
OXYGEN SATURATION: 98 % | DIASTOLIC BLOOD PRESSURE: 85 MMHG | WEIGHT: 171 LBS | HEIGHT: 67 IN | HEART RATE: 88 BPM | BODY MASS INDEX: 26.84 KG/M2 | SYSTOLIC BLOOD PRESSURE: 143 MMHG

## 2024-10-18 DIAGNOSIS — S99.921A INJURY OF TOE ON RIGHT FOOT, INITIAL ENCOUNTER: Primary | ICD-10-CM

## 2024-10-18 DIAGNOSIS — S99.921A INJURY OF TOE ON RIGHT FOOT, INITIAL ENCOUNTER: ICD-10-CM

## 2024-10-18 PROCEDURE — 1036F TOBACCO NON-USER: CPT

## 2024-10-18 PROCEDURE — 3008F BODY MASS INDEX DOCD: CPT

## 2024-10-18 PROCEDURE — 99213 OFFICE O/P EST LOW 20 MIN: CPT

## 2024-10-18 PROCEDURE — 73660 X-RAY EXAM OF TOE(S): CPT | Mod: RT

## 2024-10-18 NOTE — PROGRESS NOTES
Subjective   Patient ID: Arlyn Ervin is a 54 y.o. female who presents for Toe Injury (She hit her toe Tuesday having pain ).  HPI  Arlyn presents for pain in her R 4th toe  She states that Tuesday she stubbed her toe on a chair (3 days ago)  Since then her toe hurts and it is purple   It was sore to walk on at first, it is uncomfortable now  Walking up stairs hurts, driving is uncomfortable, she cannot wear tennis shoes  She has been candelario taping the toe   Her toe is swollen     Past Surgical History:   Procedure Laterality Date    LUMBAR DISCECTOMY  03/07/2022    OTHER SURGICAL HISTORY  01/16/2019    Lumbar discectomy      Past Medical History:   Diagnosis Date    Acute gastritis without bleeding 01/16/2019    Acute gastritis without hemorrhage    Acute left-sided low back pain with left-sided sciatica 04/26/2023    Acute upper respiratory infection, unspecified 03/28/2016    Viral upper respiratory illness    Encounter for other preprocedural examination 06/24/2014    Pre-operative general physical examination    Encounter for screening for diabetes mellitus 04/26/2018    Diabetes mellitus screening    Encounter for screening for lipoid disorders 04/26/2018    Encounter for screening for lipoid disorders    Essential (primary) hypertension 01/22/2016    Benign essential hypertension    Other intervertebral disc displacement, lumbar region 08/29/2014    Lumbar herniated disc    Personal history of other diseases of the nervous system and sense organs 02/24/2018    History of conjunctivitis    Sprain of jaw, unspecified side, initial encounter 04/15/2015    TMJ (sprain of temporomandibular joint)     Social History     Tobacco Use    Smoking status: Never    Smokeless tobacco: Never   Substance Use Topics    Alcohol use: Not Currently    Drug use: Never        Review of Systems  10 point review of systems performed and is negative except as noted in the HPI.      Current Outpatient Medications:      "cholecalciferol (Vitamin D-3) 125 MCG (5000 UT) capsule, Take 1 capsule (125 mcg) by mouth once daily., Disp: 30 capsule, Rfl: 11    clobetasol (Temovate) 0.05 % cream, Apply topically 2 times a day., Disp: , Rfl:     levothyroxine (Synthroid, Levoxyl) 125 mcg tablet, Take 1 tablet (125 mcg) by mouth early in the morning.. Take on an empty stomach at the same time each day, either 30 to 60 minutes prior to breakfast, Disp: 30 tablet, Rfl: 11    magnesium oxide (Mag-Ox) 400 mg tablet, Take 1 tablet (400 mg) by mouth once daily., Disp: , Rfl:     vitamin B complex (B-COMPLEX ORAL), Take by mouth., Disp: , Rfl:     ZINC ACETATE ORAL, Take 20 mg by mouth., Disp: , Rfl:     diazePAM (Valium) 10 mg tablet, Take 1 tablet (10 mg) by mouth 1 time for 1 dose. (Patient not taking: Reported on 10/18/2024), Disp: 1 tablet, Rfl: 0     Objective   /85   Pulse 88   Ht 1.702 m (5' 7\")   Wt 77.6 kg (171 lb)   SpO2 98%   BMI 26.78 kg/m²     Physical Exam  Constitutional:       Appearance: Normal appearance.   Musculoskeletal:      Right ankle: Normal. No swelling.      Right foot: Normal range of motion and normal capillary refill. Swelling (over 4th toe) and tenderness (over 4th toe, worse distally) present. No deformity. Normal pulse.      Comments: Ecchymosis present below nailbed of 4th toe. No other bruising on any other toes. No tenderness for any other toes or the R foot.   Neurological:      Mental Status: She is alert.         Assessment & Plan  Injury of toe on right foot, initial encounter  Xray of R toe - suspect potential fracture on distal phalanx of 4th toe. Final result for imaging is still pending from Radiology.   Baldomero taped toe in the office.  Discussed podiatry referral if pain does not improve  Orders:    XR toe right 2+ views; Future          Discussed at visit any disease processes that were of concern as well as the risks, benefits and instructions on any new medication provided. Patient (and/or " caretaker of patient if present) stated all questions were answered, and they voiced understanding of instructions.      Masha Clay PA-C

## 2024-11-28 DIAGNOSIS — E03.9 ACQUIRED HYPOTHYROIDISM: ICD-10-CM

## 2024-11-29 RX ORDER — LEVOTHYROXINE SODIUM 125 UG/1
TABLET ORAL
Qty: 90 TABLET | Refills: 3 | Status: SHIPPED | OUTPATIENT
Start: 2024-11-29

## 2025-03-18 DIAGNOSIS — E55.9 VITAMIN D DEFICIENCY: ICD-10-CM

## 2025-03-18 RX ORDER — VIT C/E/ZN/COPPR/LUTEIN/ZEAXAN 250MG-90MG
125 CAPSULE ORAL DAILY
Qty: 90 CAPSULE | Refills: 3 | Status: SHIPPED | OUTPATIENT
Start: 2025-03-18 | End: 2026-03-18

## 2025-04-24 ENCOUNTER — HOSPITAL ENCOUNTER (EMERGENCY)
Facility: HOSPITAL | Age: 55
Discharge: HOME | End: 2025-04-24
Attending: EMERGENCY MEDICINE
Payer: COMMERCIAL

## 2025-04-24 ENCOUNTER — APPOINTMENT (OUTPATIENT)
Dept: RADIOLOGY | Facility: HOSPITAL | Age: 55
End: 2025-04-24
Payer: COMMERCIAL

## 2025-04-24 VITALS
HEIGHT: 67 IN | RESPIRATION RATE: 18 BRPM | DIASTOLIC BLOOD PRESSURE: 96 MMHG | TEMPERATURE: 97.9 F | WEIGHT: 176 LBS | BODY MASS INDEX: 27.62 KG/M2 | OXYGEN SATURATION: 98 % | HEART RATE: 90 BPM | SYSTOLIC BLOOD PRESSURE: 170 MMHG

## 2025-04-24 DIAGNOSIS — M54.10 RADICULOPATHY AFFECTING UPPER EXTREMITY: Primary | ICD-10-CM

## 2025-04-24 PROCEDURE — 72125 CT NECK SPINE W/O DYE: CPT | Performed by: RADIOLOGY

## 2025-04-24 PROCEDURE — 73030 X-RAY EXAM OF SHOULDER: CPT | Mod: RIGHT SIDE | Performed by: RADIOLOGY

## 2025-04-24 PROCEDURE — 96375 TX/PRO/DX INJ NEW DRUG ADDON: CPT

## 2025-04-24 PROCEDURE — 72128 CT CHEST SPINE W/O DYE: CPT | Performed by: RADIOLOGY

## 2025-04-24 PROCEDURE — 96374 THER/PROPH/DIAG INJ IV PUSH: CPT

## 2025-04-24 PROCEDURE — 72125 CT NECK SPINE W/O DYE: CPT

## 2025-04-24 PROCEDURE — 72128 CT CHEST SPINE W/O DYE: CPT

## 2025-04-24 PROCEDURE — 99284 EMERGENCY DEPT VISIT MOD MDM: CPT | Mod: 25 | Performed by: EMERGENCY MEDICINE

## 2025-04-24 PROCEDURE — 2500000004 HC RX 250 GENERAL PHARMACY W/ HCPCS (ALT 636 FOR OP/ED): Mod: JZ | Performed by: EMERGENCY MEDICINE

## 2025-04-24 PROCEDURE — 73030 X-RAY EXAM OF SHOULDER: CPT | Mod: RT

## 2025-04-24 RX ORDER — PREDNISONE 10 MG/1
10 TABLET ORAL DAILY
Qty: 30 TABLET | Refills: 0 | Status: SHIPPED | OUTPATIENT
Start: 2025-04-24

## 2025-04-24 RX ORDER — CYCLOBENZAPRINE HCL 10 MG
10 TABLET ORAL 3 TIMES DAILY PRN
Qty: 10 TABLET | Refills: 0 | Status: SHIPPED | OUTPATIENT
Start: 2025-04-24

## 2025-04-24 RX ORDER — ONDANSETRON HYDROCHLORIDE 2 MG/ML
4 INJECTION, SOLUTION INTRAVENOUS ONCE
Status: COMPLETED | OUTPATIENT
Start: 2025-04-24 | End: 2025-04-24

## 2025-04-24 RX ORDER — KETOROLAC TROMETHAMINE 15 MG/ML
15 INJECTION, SOLUTION INTRAMUSCULAR; INTRAVENOUS ONCE
Status: COMPLETED | OUTPATIENT
Start: 2025-04-24 | End: 2025-04-24

## 2025-04-24 RX ORDER — MORPHINE SULFATE 4 MG/ML
4 INJECTION INTRAVENOUS ONCE
Status: COMPLETED | OUTPATIENT
Start: 2025-04-24 | End: 2025-04-24

## 2025-04-24 RX ORDER — ORPHENADRINE CITRATE 30 MG/ML
60 INJECTION INTRAMUSCULAR; INTRAVENOUS ONCE
Status: COMPLETED | OUTPATIENT
Start: 2025-04-24 | End: 2025-04-24

## 2025-04-24 RX ORDER — LIDOCAINE 560 MG/1
1 PATCH PERCUTANEOUS; TOPICAL; TRANSDERMAL DAILY
Qty: 5 PATCH | Refills: 0 | Status: SHIPPED | OUTPATIENT
Start: 2025-04-24 | End: 2025-04-29

## 2025-04-24 RX ADMIN — KETOROLAC TROMETHAMINE 15 MG: 15 INJECTION, SOLUTION INTRAMUSCULAR; INTRAVENOUS at 09:12

## 2025-04-24 RX ADMIN — METHYLPREDNISOLONE SODIUM SUCCINATE 125 MG: 125 INJECTION, POWDER, FOR SOLUTION INTRAMUSCULAR; INTRAVENOUS at 09:12

## 2025-04-24 RX ADMIN — MORPHINE SULFATE 4 MG: 4 INJECTION INTRAVENOUS at 11:40

## 2025-04-24 RX ADMIN — ORPHENADRINE CITRATE 60 MG: 60 INJECTION INTRAMUSCULAR; INTRAVENOUS at 09:12

## 2025-04-24 RX ADMIN — ONDANSETRON 4 MG: 2 INJECTION, SOLUTION INTRAMUSCULAR; INTRAVENOUS at 11:41

## 2025-04-24 ASSESSMENT — LIFESTYLE VARIABLES
HAVE PEOPLE ANNOYED YOU BY CRITICIZING YOUR DRINKING: NO
EVER FELT BAD OR GUILTY ABOUT YOUR DRINKING: NO
HAVE YOU EVER FELT YOU SHOULD CUT DOWN ON YOUR DRINKING: NO
TOTAL SCORE: 0
EVER HAD A DRINK FIRST THING IN THE MORNING TO STEADY YOUR NERVES TO GET RID OF A HANGOVER: NO

## 2025-04-24 ASSESSMENT — PAIN DESCRIPTION - LOCATION: LOCATION: ARM

## 2025-04-24 ASSESSMENT — COLUMBIA-SUICIDE SEVERITY RATING SCALE - C-SSRS
1. IN THE PAST MONTH, HAVE YOU WISHED YOU WERE DEAD OR WISHED YOU COULD GO TO SLEEP AND NOT WAKE UP?: NO
6. HAVE YOU EVER DONE ANYTHING, STARTED TO DO ANYTHING, OR PREPARED TO DO ANYTHING TO END YOUR LIFE?: NO
2. HAVE YOU ACTUALLY HAD ANY THOUGHTS OF KILLING YOURSELF?: NO

## 2025-04-24 ASSESSMENT — PAIN DESCRIPTION - FREQUENCY: FREQUENCY: CONSTANT/CONTINUOUS

## 2025-04-24 ASSESSMENT — PAIN DESCRIPTION - ORIENTATION: ORIENTATION: RIGHT

## 2025-04-24 ASSESSMENT — PAIN - FUNCTIONAL ASSESSMENT: PAIN_FUNCTIONAL_ASSESSMENT: 0-10

## 2025-04-24 ASSESSMENT — PAIN DESCRIPTION - PAIN TYPE: TYPE: ACUTE PAIN

## 2025-04-24 ASSESSMENT — PAIN DESCRIPTION - PROGRESSION: CLINICAL_PROGRESSION: GRADUALLY WORSENING

## 2025-04-24 ASSESSMENT — PAIN SCALES - GENERAL: PAINLEVEL_OUTOF10: 10 - WORST POSSIBLE PAIN

## 2025-04-24 ASSESSMENT — PAIN DESCRIPTION - ONSET: ONSET: ONGOING

## 2025-04-24 ASSESSMENT — PAIN DESCRIPTION - DESCRIPTORS: DESCRIPTORS: ACHING;RADIATING;SHOOTING

## 2025-04-24 ASSESSMENT — ACTIVITIES OF DAILY LIVING (ADL): LACK_OF_TRANSPORTATION: NO

## 2025-04-24 NOTE — DISCHARGE INSTRUCTIONS
Alternate 600 mg of Motrin i.e. 3 pills and 2 tablets of Tylenol [either Exer strength or regular]  Give 1 medicine and then 3 hours later give the other medicine.  Every 3 hours may alternate  Take with food to help prevent bleeding.

## 2025-04-24 NOTE — PROGRESS NOTES
04/24/25 1058   Discharge Planning   Living Arrangements Spouse/significant other   Support Systems Spouse/significant other   Assistance Needed A&OX4; independent with ADLs with no DME; drives; room air baseline and yovani room air; PCP Dr Howard   Type of Residence Private residence   Number of Stairs to Enter Residence 2   Number of Stairs Within Residence 14  (14 to basement)   Do you have animals or pets at home? Yes   Type of Animals or Pets 4dogs   Who is requesting discharge planning? Provider   Expected Discharge Disposition Home  (Patient denies home going needs. Denies need for home care services)   Does the patient need discharge transport arranged? No   Financial Resource Strain   How hard is it for you to pay for the very basics like food, housing, medical care, and heating? Not hard   Housing Stability   In the last 12 months, was there a time when you were not able to pay the mortgage or rent on time? N   In the past 12 months, how many times have you moved where you were living? 0   At any time in the past 12 months, were you homeless or living in a shelter (including now)? N   Transportation Needs   In the past 12 months, has lack of transportation kept you from medical appointments or from getting medications? no   In the past 12 months, has lack of transportation kept you from meetings, work, or from getting things needed for daily living? No     04/248/2025 1059am  Spoke with patient and patient's spouse bedside in ED

## 2025-04-24 NOTE — ED PROVIDER NOTES
HPI   Chief Complaint   Patient presents with    Back Pain       Arlyn is a 55-year-old woman who comes in with neck upper back and right arm pain.  Pain started on Monday and was just uncomfortable but on Tuesday seem to get worse and on Wednesday she had to leave work early because of the discomfort.  She has had bursitis in the right rotator cuff problems in the past.  She did fall approximately week ago but seem to get better and then this occurred.  She notes that at 3 AM she could not sleep and woke up and took the last dose of the Motrin and Tylenol.  She has been taking 2 Motrin and 1 Tylenol which initially helped a little bit.  She denies any shortness of breath only has a slight cough but no true cold symptoms.  She has been trying to do stretches to try to help and has tried heat and cold.  She has a history of low back surgery in the past.  Her pain is currently about an 8 out of 10 at rest.  She is allergic to Vicodin which causes her to be nauseous.  History comes from patient spouse and EMR.              Patient History   Medical History[1]  Surgical History[2]  Family History[3]  Social History[4]    Physical Exam   ED Triage Vitals [04/24/25 0836]   Temperature Heart Rate Respirations BP   36.6 °C (97.9 °F) 92 18 (!) 193/93      Pulse Ox Temp Source Heart Rate Source Patient Position   96 % Temporal Monitor --      BP Location FiO2 (%)     -- --       Physical Exam  Vitals reviewed.   Constitutional:       General: She is awake.      Comments: Appears uncomfortable   HENT:      Head: Normocephalic.      Nose: Nose normal.   Cardiovascular:      Rate and Rhythm: Normal rate and regular rhythm.   Pulmonary:      Effort: Pulmonary effort is normal.      Breath sounds: Normal breath sounds.   Abdominal:      Palpations: Abdomen is soft.   Musculoskeletal:      Cervical back: Normal range of motion.      Comments: Limited range of motion of the right shoulder due to pain.    Patient has no midline  cervical tenderness to palpation with fair range of motion of her neck increased pain at extremes.  Axial loading causes increased pain in her neck and upper arm    No long bone deformity noted of either upper extremities with 2+ pulses at the wrist and normal sensation and strength of the hand on the right.    Lower extremities do not have any edema   Skin:     General: Skin is warm.      Capillary Refill: Capillary refill takes less than 2 seconds.   Neurological:      Mental Status: She is alert.      Comments: Patient has tremor noted in her head and arm with a history of essential tremor this is not new           ED Course & [facility]   ED Course as of 04/24/25 1625   Thu Apr 24, 2025   0854 Patient presents with increasing right arm pain radiating from her neck.  Started on Monday has been getting worse.  She has been taking some Motrin and Tylenol without much help.  Pains been getting worse and she came in for evaluation treatment.  Plan is to work her up with imaging of cervical and thoracic spines via CT and x-rays of her shoulder.  In addition we will treat her for pain using Toradol 15 IV Solu-Medrol and Norflex 60.  I believe this is pain via radiculopathy.  Although she did fall about a week ago she did get better I do not believe that there is an obvious fracture but it does clinically sound like a radicular pain.  Will also do an EKG.  I do not believe blood work is clinically indicated. [RZ]   0858 Patient is a right handed [RZ]   1230 Patient improved on reexam.  Will be given medicines to try to help with her pain and a work note. [RZ]      ED Course User Index  [RZ] Vu Johnson MD         Diagnoses as of 04/24/25 1625   Radiculopathy affecting upper extremity                 No data recorded     [place] Coma Scale Score: 15 (04/24/25 0840 : Anthony Clacny RN)                           Medical Decision Making      Procedure  Procedures       [1]   Past Medical History:  Diagnosis Date    Acute  gastritis without bleeding 01/16/2019    Acute gastritis without hemorrhage    Acute left-sided low back pain with left-sided sciatica 04/26/2023    Acute upper respiratory infection, unspecified 03/28/2016    Viral upper respiratory illness    Encounter for other preprocedural examination 06/24/2014    Pre-operative general physical examination    Encounter for screening for diabetes mellitus 04/26/2018    Diabetes mellitus screening    Encounter for screening for lipoid disorders 04/26/2018    Encounter for screening for lipoid disorders    Essential (primary) hypertension 01/22/2016    Benign essential hypertension    Other intervertebral disc displacement, lumbar region 08/29/2014    Lumbar herniated disc    Personal history of other diseases of the nervous system and sense organs 02/24/2018    History of conjunctivitis    Sprain of jaw, unspecified side, initial encounter 04/15/2015    TMJ (sprain of temporomandibular joint)   [2]   Past Surgical History:  Procedure Laterality Date    LUMBAR DISCECTOMY  03/07/2022    OTHER SURGICAL HISTORY  01/16/2019    Lumbar discectomy   [3] No family history on file.  [4]   Social History  Tobacco Use    Smoking status: Never    Smokeless tobacco: Never   Substance Use Topics    Alcohol use: Not Currently    Drug use: Never        Vu Johnson MD  04/24/25 0503

## 2025-04-24 NOTE — Clinical Note
Arlyn Ervin was seen and treated in our emergency department on 4/24/2025.  She may return to work on 04/26/2025.       If you have any questions or concerns, please don't hesitate to call.      Gerson Morales RN

## 2025-04-24 NOTE — ED TRIAGE NOTES
Patient states on Monday she developed upper back pain that began to radiate down her right arm, yesterday the pain became so bad she had to leave work early. This morning she was unable to move her right arm without any pain and OTC meds have not been helping. Patient states she did have a fall down 2 stairs about 2 to 3 weeks ago.

## 2025-08-06 DIAGNOSIS — Z12.11 SCREENING FOR COLORECTAL CANCER: ICD-10-CM

## 2025-08-06 DIAGNOSIS — Z12.12 SCREENING FOR COLORECTAL CANCER: ICD-10-CM

## 2025-08-22 ENCOUNTER — APPOINTMENT (OUTPATIENT)
Dept: PRIMARY CARE | Facility: CLINIC | Age: 55
End: 2025-08-22
Payer: COMMERCIAL

## 2025-08-22 VITALS
WEIGHT: 175 LBS | HEART RATE: 101 BPM | BODY MASS INDEX: 27.47 KG/M2 | DIASTOLIC BLOOD PRESSURE: 78 MMHG | OXYGEN SATURATION: 98 % | SYSTOLIC BLOOD PRESSURE: 128 MMHG | HEIGHT: 67 IN

## 2025-08-22 DIAGNOSIS — Z12.31 ENCOUNTER FOR SCREENING MAMMOGRAM FOR BREAST CANCER: ICD-10-CM

## 2025-08-22 DIAGNOSIS — Z00.00 WELL ADULT EXAM: Primary | ICD-10-CM

## 2025-08-22 DIAGNOSIS — E55.9 VITAMIN D DEFICIENCY: ICD-10-CM

## 2025-08-22 DIAGNOSIS — R73.01 IMPAIRED FASTING GLUCOSE: ICD-10-CM

## 2025-08-22 DIAGNOSIS — E78.1 HYPERTRIGLYCERIDEMIA: ICD-10-CM

## 2025-08-22 DIAGNOSIS — E03.9 ACQUIRED HYPOTHYROIDISM: ICD-10-CM

## 2025-08-22 PROCEDURE — 3008F BODY MASS INDEX DOCD: CPT | Performed by: FAMILY MEDICINE

## 2025-08-22 PROCEDURE — 99396 PREV VISIT EST AGE 40-64: CPT | Performed by: FAMILY MEDICINE

## 2025-08-22 RX ORDER — LANCETS
EACH MISCELLANEOUS
Qty: 100 EACH | Refills: 3 | Status: SHIPPED | OUTPATIENT
Start: 2025-08-22

## 2025-08-22 RX ORDER — INSULIN PUMP SYRINGE, 3 ML
EACH MISCELLANEOUS
Qty: 1 EACH | Refills: 0 | Status: SHIPPED | OUTPATIENT
Start: 2025-08-22

## 2025-08-22 RX ORDER — VIT C/E/ZN/COPPR/LUTEIN/ZEAXAN 250MG-90MG
125 CAPSULE ORAL DAILY
Qty: 90 CAPSULE | Refills: 3 | Status: SHIPPED | OUTPATIENT
Start: 2025-08-22 | End: 2026-08-22

## 2025-08-22 RX ORDER — LEVOTHYROXINE SODIUM 125 UG/1
125 TABLET ORAL DAILY
Qty: 90 TABLET | Refills: 3 | Status: SHIPPED | OUTPATIENT
Start: 2025-08-22

## 2025-08-22 ASSESSMENT — ENCOUNTER SYMPTOMS
OCCASIONAL FEELINGS OF UNSTEADINESS: 0
LOSS OF SENSATION IN FEET: 0
DEPRESSION: 0

## 2025-08-22 ASSESSMENT — PATIENT HEALTH QUESTIONNAIRE - PHQ9
2. FEELING DOWN, DEPRESSED OR HOPELESS: NOT AT ALL
1. LITTLE INTEREST OR PLEASURE IN DOING THINGS: NOT AT ALL
SUM OF ALL RESPONSES TO PHQ9 QUESTIONS 1 & 2: 0

## 2025-08-23 LAB
25(OH)D3+25(OH)D2 SERPL-MCNC: 33 NG/ML (ref 30–100)
ALBUMIN SERPL-MCNC: 4.8 G/DL (ref 3.6–5.1)
ALP SERPL-CCNC: 80 U/L (ref 37–153)
ALT SERPL-CCNC: 19 U/L (ref 6–29)
ANION GAP SERPL CALCULATED.4IONS-SCNC: 10 MMOL/L (CALC) (ref 7–17)
AST SERPL-CCNC: 16 U/L (ref 10–35)
BILIRUB SERPL-MCNC: 0.6 MG/DL (ref 0.2–1.2)
BUN SERPL-MCNC: 14 MG/DL (ref 7–25)
CALCIUM SERPL-MCNC: 9.9 MG/DL (ref 8.6–10.4)
CHLORIDE SERPL-SCNC: 105 MMOL/L (ref 98–110)
CHOLEST SERPL-MCNC: 238 MG/DL
CHOLEST/HDLC SERPL: 6.1 (CALC)
CO2 SERPL-SCNC: 26 MMOL/L (ref 20–32)
CREAT SERPL-MCNC: 0.73 MG/DL (ref 0.5–1.03)
EGFRCR SERPLBLD CKD-EPI 2021: 96 ML/MIN/1.73M2
GLUCOSE SERPL-MCNC: 104 MG/DL (ref 65–99)
HDLC SERPL-MCNC: 39 MG/DL
LDLC SERPL CALC-MCNC: ABNORMAL MG/DL
NONHDLC SERPL-MCNC: 199 MG/DL (CALC)
POTASSIUM SERPL-SCNC: 4.6 MMOL/L (ref 3.5–5.3)
PROT SERPL-MCNC: 7.4 G/DL (ref 6.1–8.1)
SODIUM SERPL-SCNC: 141 MMOL/L (ref 135–146)
TRIGL SERPL-MCNC: 420 MG/DL
TSH SERPL-ACNC: 0.62 MIU/L (ref 0.4–4.5)

## 2025-08-23 ASSESSMENT — ENCOUNTER SYMPTOMS
BRUISES/BLEEDS EASILY: 0
DYSPHORIC MOOD: 0
VOMITING: 0
NUMBNESS: 0
EYE PAIN: 0
ARTHRALGIAS: 0
COUGH: 0
TROUBLE SWALLOWING: 0
FREQUENCY: 0
SORE THROAT: 0
SHORTNESS OF BREATH: 0
ABDOMINAL PAIN: 0
POLYPHAGIA: 0
COLOR CHANGE: 0
EYE REDNESS: 0
POLYDIPSIA: 0
BLOOD IN STOOL: 0
NERVOUS/ANXIOUS: 1
DIZZINESS: 0
WEAKNESS: 0
TREMORS: 0
CHILLS: 0
FATIGUE: 0
ADENOPATHY: 0
CONSTIPATION: 0
CHEST TIGHTNESS: 0
HEMATURIA: 0
PALPITATIONS: 0
DYSURIA: 0
WHEEZING: 0
FEVER: 0
DIARRHEA: 0
NAUSEA: 0
BACK PAIN: 0
HEADACHES: 0